# Patient Record
Sex: MALE
[De-identification: names, ages, dates, MRNs, and addresses within clinical notes are randomized per-mention and may not be internally consistent; named-entity substitution may affect disease eponyms.]

---

## 2018-02-20 ENCOUNTER — CHARTING TRANS (OUTPATIENT)
Dept: OTHER | Age: 33
End: 2018-02-20

## 2018-02-20 ASSESSMENT — PAIN SCALES - GENERAL: PAINLEVEL_OUTOF10: 0

## 2018-11-01 VITALS
OXYGEN SATURATION: 98 % | SYSTOLIC BLOOD PRESSURE: 146 MMHG | TEMPERATURE: 98.7 F | HEART RATE: 88 BPM | DIASTOLIC BLOOD PRESSURE: 88 MMHG | RESPIRATION RATE: 16 BRPM

## 2025-04-03 ENCOUNTER — TELEPHONE (OUTPATIENT)
Dept: NEUROLOGY | Facility: CLINIC | Age: 40
End: 2025-04-03

## 2025-04-03 NOTE — TELEPHONE ENCOUNTER
[facility]. Endorsed to nurses bin for upcoming appointment.     Future Appointments   Date Time Provider Department Center   5/27/2025  8:20 AM Bennett Hernandez DO [id number]

## 2025-04-17 ENCOUNTER — TELEPHONE (OUTPATIENT)
Dept: NEUROLOGY | Facility: CLINIC | Age: 40
End: 2025-04-17

## 2025-04-17 ENCOUNTER — OFFICE VISIT (OUTPATIENT)
Dept: NEUROLOGY | Facility: CLINIC | Age: 40
End: 2025-04-17
Payer: OTHER GOVERNMENT

## 2025-04-17 VITALS
RESPIRATION RATE: 16 BRPM | WEIGHT: 180 LBS | HEART RATE: 68 BPM | SYSTOLIC BLOOD PRESSURE: 122 MMHG | DIASTOLIC BLOOD PRESSURE: 62 MMHG

## 2025-04-17 DIAGNOSIS — G43.709 CHRONIC MIGRAINE WITHOUT AURA WITHOUT STATUS MIGRAINOSUS, NOT INTRACTABLE: Primary | ICD-10-CM

## 2025-04-17 PROCEDURE — 99204 OFFICE O/P NEW MOD 45 MIN: CPT | Performed by: OTHER

## 2025-04-17 RX ORDER — FOLIC ACID 1 MG/1
TABLET ORAL
COMMUNITY

## 2025-04-17 RX ORDER — NORTRIPTYLINE HYDROCHLORIDE 10 MG/1
10 CAPSULE ORAL
COMMUNITY
Start: 2024-07-29

## 2025-04-17 RX ORDER — PRAZOSIN HYDROCHLORIDE 1 MG/1
1 CAPSULE ORAL
COMMUNITY
Start: 2024-07-29

## 2025-04-17 RX ORDER — SUMATRIPTAN 50 MG/1
50 TABLET, FILM COATED ORAL
COMMUNITY
Start: 2024-07-29

## 2025-04-17 RX ORDER — ATOGEPANT 60 MG/1
60 TABLET ORAL DAILY
Qty: 16 TABLET | Refills: 0 | COMMUNITY
Start: 2025-04-17

## 2025-04-17 RX ORDER — GABAPENTIN 100 MG/1
100 CAPSULE ORAL
COMMUNITY
Start: 2024-07-29

## 2025-04-17 RX ORDER — ATOGEPANT 60 MG/1
60 TABLET ORAL DAILY
Qty: 30 TABLET | Refills: 3 | Status: SHIPPED | OUTPATIENT
Start: 2025-04-17

## 2025-04-17 RX ORDER — ATOGEPANT 60 MG/1
60 TABLET ORAL DAILY
Qty: 30 TABLET | Refills: 3 | Status: SHIPPED | OUTPATIENT
Start: 2025-04-17 | End: 2025-04-17

## 2025-04-17 RX ORDER — ESCITALOPRAM OXALATE 20 MG/1
20 TABLET ORAL
COMMUNITY
Start: 2024-07-29

## 2025-04-17 RX ORDER — PROPRANOLOL HYDROCHLORIDE 60 MG/1
60 CAPSULE, EXTENDED RELEASE ORAL
COMMUNITY
Start: 2024-07-29

## 2025-04-17 NOTE — TELEPHONE ENCOUNTER
Pt in office today and given samples of Qulipta.    Pt prescribed [facility] by provider [facility]    [facility]

## 2025-04-17 NOTE — PATIENT INSTRUCTIONS
After your visit at the [facility] today,  please direct any follow up questions or medication needs to the staff in our [place] office so that your concerns may be promptly addressed.  We are available through [employer] or at the numbers below:    The phone number is:   [phone] option #1    The fax number is:  [phone]    Your pharmacy should also send any requests electronically to the [place] office.  Refill policies:    Allow 2-3 business days for refills; controlled substances may take longer.  Contact your pharmacy at least 5 days prior to running out of medication and have them send an electronic request or submit request through the “request refill” option in your [employer] account.  Refills are not addressed on weekends; covering physicians do not authorize routine medications on weekends.  No narcotics or controlled substances are refilled after noon on Fridays or by on call physicians.  By law, narcotics must be electronically prescribed.  A 30 day supply with no refills is the maximum allowed.  If your prescription is due for a refill, you may be due for a follow up appointment.  To best provide you care, patients receiving routine medications need to be seen at least once a year.  Patients receiving narcotic/controlled substance medications need to be seen at least once every 3 months.  In the event that your preferred pharmacy does not have the requested medication in stock (e.g. Backordered), it is your responsibility to find another pharmacy that has the requested medication available.  We will gladly send a new prescription to that pharmacy at your request.    Scheduling Tests:    If your physician has ordered radiology tests such as MRI or CT scans, please contact Central Scheduling at [phone] right away to schedule the test.  Once scheduled, [facility] will work with your insurance carrier to obtain pre-certification or prior authorization.   Depending on your insurance carrier, approval may take 3-10 days.  It is highly recommended patients assure they have received an authorization before having a test performed.  If test is done without insurance authorization, patient may be responsible for the entire amount billed.      Precertification and Prior Authorizations:  If your physician has recommended that you have a procedure or additional testing performed the [facility] Centralized Referral Team will contact your insurance carrier to obtain pre-certification or prior authorization.    You are strongly encouraged to contact your insurance carrier to verify that your procedure/test has been approved and is a COVERED benefit.  Although the [facility] Centralized Referral Team does its due diligence, the insurance carrier gives the disclaimer that \"Although the procedure is authorized, this does not guarantee payment.\"    Ultimately the patient is responsible for payment.   Thank you for your understanding in this matter.  Paperwork Completion:  If you require FMLA or disability paperwork for your recovery, please make sure to either drop it off or have it faxed to our office at [phone]. Be sure the form has your name and date of birth on it.  The form will be faxed to our Forms Department and they will complete it for you.  There is a 25$ fee for all forms that need to be filled out.  Please be aware there is a 10-14 day turnaround time.  You will need to sign a release of information (SAHARA) form if your paperwork does not come with one.  You may call the Forms Department with any questions at [phone].  Their fax number is [phone].

## 2025-04-17 NOTE — H&P
Neurology H&P    Marc Poole Patient Status:  No patient class for patient encounter    1985 MRN [id number]   Location [facility], [address] [place] [address] Attending No att. providers found   Hosp Day # 0 PCP Louis Hatfield MD     Subjective:  Marc Poole is a(n) 39 year old male with a PMH significant for PTSD who comes to the neurology clinic for evalaution of migraines. He states that [facility]. He states that he started to get migraines in the . He states that he has had brain imaging in [place] for his headaches but I have no notes to review regarding this. His migraines started a couple years ago and have been worsening. He gets migraines that last up to 3 days a few times per week. He states that he gets on average about 16 migraines per month. He endorses sensitivity to light and sound. He gets a little nauseated but does not have vomiting. He is currently taking propanol, gabapentin and nortriptyline and sumatriptan. He has no FH of migraine. He does not know of any weather or food triggers for his migraines.      Current Medications:  Current Medications[1]    Problem List:  Problem List[2]    PMHx:  Past Medical History[3]    PSHx:  Past Surgical History[4]    SocHx:  Short Social Hx on File[5]    Family History:  Family History[6]    No FH of migraine    ROS:  10 point ROS completed and was negative, except for pertinent positive and negatives stated in subjective.    Objective/Physical Exam:    Vital Signs:  Blood pressure 122/62, pulse 68, resp. rate 16, weight 180 lb (81.6 kg).    Gen: Awake and in no apparent distress  HEENT: moist mucus membranes  Neck: Supple  Cardiovascular: Regular rate and rhythm, no murmur  Pulm: CTAB  GI: non-tender, normal bowel sounds  Skin: normal, dry  Extremities: No clubbing or cyanosis      Neurologic:   MENTAL STATUS: alert, ox3, normal attention, language and fund of knowledge.      CRANIAL  NERVES II to XII: PERRLA, no ptosis or diplopia, EOM intact, facial sensation intact, strong eye closure, face is symmetric, no dysarthria, tongue midline,  no tongue fasciculations or atrophy, strong shoulder shrug.    MOTOR EXAMINATION: normal tone, no fasciculations, normal strength throughout in UEs and LEs      SENSORY EXAMINATION:  UE: intact to light touch, pinprick intact  LE: intact to light touch, pinprick intact    COORDINATION:  No dysmetria, or intention tremors     REFLEXES: 2+ at biceps, 2+ brachioradialis, 2+ at patella     GAIT: normal stance, normal toe gait and heel gait, tandem well.    Romberg's: negative        Labs:       Imaging:      Assessment:  This is a 40 y/o male with migraine headaches.  He is getting 15 or more migraines per month at this time.  He is already on nortriptyline, propranolol, and gabapentin as well as sumatriptan for breakthrough.  He states that 3 preventative medications did not really reduce his migraine frequency.  We discussed Botox today.  He would like to hold off on any sort of Botox for now.  He would like to avoid any sort of injections at this time.  We can try Qulipta 60 mg daily for migraine prevention.      Plan:  Migraine w/o aura  - Already on gabapentin, nortriptyline and propanol  - Declines botox at this time  - Start Qulipta 60mg Qday  - We also discussed will Hernandez, DO  Neurology         [1]   Current Outpatient Medications   Medication Sig Dispense Refill    diclofenac 1 % External Gel Apply topically.      escitalopram 20 MG Oral Tab Take 1 tablet (20 mg total) by mouth.      gabapentin 100 MG Oral Cap Take 1 capsule (100 mg total) by mouth 3 (three) times daily before meals.      prazosin 1 MG Oral Cap Take 1 capsule (1 mg total) by mouth.      nortriptyline 10 MG Oral Cap Take 1 capsule (10 mg total) by mouth.      Propranolol HCl ER 60 MG Oral Capsule SR 24 Hr Take 1 capsule (60 mg total) by mouth.      SUMAtriptan 50 MG Oral  Tab Take 1 tablet (50 mg total) by mouth.      Cholecalciferol (VITAMIN D3) 1.25 MG (60025 UT) Oral Cap Take by mouth.     [2] There is no problem list on file for this patient.   [3]   Past Medical History:   Arthritis    Migraines    PTSD (post-traumatic stress disorder)    Sleep apnea   [4] History reviewed. No pertinent surgical history.  [5]   Social History  Socioeconomic History    Marital status:    Tobacco Use    Smoking status: Every Day     Types: Cigarettes    Smokeless tobacco: Former   Substance and Sexual Activity    Alcohol use: Yes     Comment: some    Drug use: Never   Other Topics Concern    Caffeine Concern No    Exercise No   [6] History reviewed. No pertinent family history.

## 2025-04-17 NOTE — PROGRESS NOTES
Pt reports headaches started approximately 1 year ago.  Headaches every other day.  Can last 3 days straight.

## 2025-07-17 ENCOUNTER — OFFICE VISIT (OUTPATIENT)
Dept: NEUROLOGY | Facility: CLINIC | Age: 40
End: 2025-07-17
Payer: OTHER GOVERNMENT

## 2025-07-17 VITALS — HEART RATE: 73 BPM | DIASTOLIC BLOOD PRESSURE: 68 MMHG | RESPIRATION RATE: 16 BRPM | SYSTOLIC BLOOD PRESSURE: 126 MMHG

## 2025-07-17 DIAGNOSIS — G43.709 CHRONIC MIGRAINE WITHOUT AURA WITHOUT STATUS MIGRAINOSUS, NOT INTRACTABLE: Primary | ICD-10-CM

## 2025-07-17 PROCEDURE — 99214 OFFICE O/P EST MOD 30 MIN: CPT | Performed by: OTHER

## 2025-07-17 RX ORDER — SUMATRIPTAN 50 MG/1
TABLET, FILM COATED ORAL
Qty: 9 TABLET | Refills: 5 | Status: SHIPPED | OUTPATIENT
Start: 2025-07-17

## 2025-07-17 RX ORDER — BUPROPION HYDROCHLORIDE 150 MG/1
150 TABLET ORAL
COMMUNITY
Start: 2025-04-30 | End: 2025-07-17 | Stop reason: ALTCHOICE

## 2025-07-17 RX ORDER — ATOGEPANT 60 MG/1
60 TABLET ORAL DAILY
Qty: 30 TABLET | Refills: 5 | Status: SHIPPED | OUTPATIENT
Start: 2025-07-17

## 2025-07-17 RX ORDER — NORTRIPTYLINE HYDROCHLORIDE 25 MG/1
25 CAPSULE ORAL NIGHTLY
Qty: 90 CAPSULE | Refills: 1 | Status: SHIPPED | OUTPATIENT
Start: 2025-07-17

## 2025-07-17 NOTE — PROGRESS NOTES
Pt reports he continues to have migraines 2 times per week.  [facility].           The following individual(s) verbally consented to be recorded using ambient AI listening technology and understand that they can each withdraw their consent to this listening technology at any point by asking the clinician to turn off or pause the recording:    Patient name: Marc Poole

## 2025-07-17 NOTE — PROGRESS NOTES
Neurology H&P    Marc Poole Patient Status:  No patient class for patient encounter    1985 MRN [id number]   Location [facility], [address] [place] [address] Attending No att. providers found   Hosp Day # 0 PCP Louis Hatfield MD     Subjective:  Marc Poole is a(n) 40 year old male.    INtiital HPI 25  Marc Poole is a(n) 39 year old male with a PMH significant for PTSD who comes to the neurology clinic for evalaution of migraines. He [facility] that [facility]. He states that he started to get migraines in the . He states that he has had brain imaging in [place] for his headaches but I have no notes to review regarding this. His migraines started a couple years ago and have been worsening. He gets migraines that last up to 3 days a few times per week. He states that he gets on average about 16 migraines per month. He endorses sensitivity to light and sound. He gets a little nauseated but does not have vomiting. He is currently taking propanol, gabapentin and nortriptyline and sumatriptan. He has no FH of migraine. He does not know of any weather or food triggers for his migraines.   History of Present Illness  Marc Poole is a 40 year old male with chronic migraines who presents for follow-up on migraine management.    He experiences approximately 20 migraines per month. The frequency remains high, but the duration of each episode has decreased, with fewer migraines occurring overnight. The effectiveness of Qulipta is variable, providing relief at times.    He is currently on multiple medications for migraine management, including Qulipta and nortriptyline. He also uses sumatriptan, taking one tablet at the onset of a headache and another two to four hours later, as needed. He was previously unaware of the dosing instructions for sumatriptan, as they were not provided on the medication bottle.    He feels overwhelmed by the number of  medications he is taking and mentions being 'tired of all the medication.'     Current Medications:  Current Medications[1]    Problem List:  Problem List[2]    PMHx:  Past Medical History[3]    PSHx:  Past Surgical History[4]    SocHx:  Short Social Hx on File[5]    Family History:  Family History[6]    No FH of migraine    ROS:  10 point ROS completed and was negative, except for pertinent positive and negatives stated in subjective.    Objective/Physical Exam:    Vital Signs:  Blood pressure 126/68, pulse 73, resp. rate 16.    Gen: Awake and in no apparent distress  HEENT: moist mucus membranes  Neck: Supple  Cardiovascular: Regular rate and rhythm, no murmur  Pulm: CTAB  GI: non-tender, normal bowel sounds  Skin: normal, dry  Extremities: No clubbing or cyanosis      Neurologic:   Physical Exam    MENTAL STATUS: alert, ox3, normal attention, language and fund of knowledge.       CRANIAL NERVES II to XII: PERRLA, no ptosis or diplopia, EOM intact, facial sensation intact, strong eye closure, face is symmetric, no dysarthria, tongue midline,  no tongue fasciculations or atrophy, strong shoulder shrug.     MOTOR EXAMINATION: normal tone, no fasciculations, normal strength throughout in UEs and LEs       SENSORY EXAMINATION:  UE: intact to light touch, pinprick intact  LE: intact to light touch, pinprick intact     COORDINATION:  No dysmetria, or intention tremors      REFLEXES: 2+ at biceps, 2+ brachioradialis, 2+ at patella      GAIT: normal stance, normal toe gait and heel gait, tandem well.     Romberg's: negative         Labs:       Imaging:  No CNS imaging to review       Assessment & Plan  Migraine without aura  Migraines, approximately 20 times monthly, with partial response to current regimen. Agreed to increase nortriptyline to 25mg nightly. .  - Increase nortriptyline to 25 mg nightly.  - Continue Qulipta as prescribed.  - Refill Qulipta and sumatriptan.  - Instructed on sumatriptan: one tablet at  headache onset, repeat in 2-4 hours, max 2-3 times weekly.  - Consider Botox if migraines persist, noting potential [facility] restrictions with Qulipta.        Bennett Hernandez, DO  Neurology         [1]   Current Outpatient Medications   Medication Sig Dispense Refill    SUMAtriptan 50 MG Oral Tab Take one tablet at onset of migraine.  If migraine continues, may take one tablet 2 hours later.  NO more than 2 tablets in 24 hours and only use max 2 days per week 9 tablet 5    Atogepant (QULIPTA) 60 MG Oral Tab Take 60 mg by mouth daily. 30 tablet 5    nortriptyline 25 MG Oral Cap Take 1 capsule (25 mg total) by mouth nightly. 90 capsule 1    diclofenac 1 % External Gel Apply topically.      escitalopram 20 MG Oral Tab Take 1 tablet (20 mg total) by mouth.      gabapentin 100 MG Oral Cap Take 1 capsule (100 mg total) by mouth 3 (three) times daily before meals.      prazosin 1 MG Oral Cap Take 1 capsule (1 mg total) by mouth.      Propranolol HCl ER 60 MG Oral Capsule SR 24 Hr Take 1 capsule (60 mg total) by mouth.      Cholecalciferol (VITAMIN D3) 1.25 MG (54380 UT) Oral Cap Take by mouth.      Atogepant (QULIPTA) 60 MG Oral Tab Take 60 mg by mouth daily. 16 tablet 0   [2]   Patient Active Problem List  Diagnosis    Chronic migraine without aura without status migrainosus, not intractable   [3]   Past Medical History:   Anxiety    Arthritis    Depression    Essential hypertension    Migraines    PTSD (post-traumatic stress disorder)    Sleep apnea   [4] History reviewed. No pertinent surgical history.  [5]   Social History  Socioeconomic History    Marital status:    Tobacco Use    Smoking status: Every Day     Current packs/day: 1.00     Average packs/day: 1 pack/day for 5.0 years (5.0 ttl pk-yrs)     Types: Cigarettes    Smokeless tobacco: Former   Substance and Sexual Activity    Alcohol use: Yes     Alcohol/week: 6.0 standard drinks of alcohol     Types: 6 Cans of beer per week     Comment: some    Drug use:  Never   Other Topics Concern    Caffeine Concern No    Exercise Yes    Seat Belt No    Special Diet No    Stress Concern Yes    Weight Concern No   [6] History reviewed. No pertinent family history.

## 2025-07-17 NOTE — PATIENT INSTRUCTIONS
After your visit at the [facility] today,  please direct any follow up questions or medication needs to the staff in our [place] office so that your concerns may be promptly addressed.  We are available through [employer] or at the numbers below:    The phone number is:   [phone] option #1    The fax number is:  [phone]    Your pharmacy should also send any requests electronically to the [place] office.  Refill policies:    Allow 2-3 business days for refills; controlled substances may take longer.  Contact your pharmacy at least 5 days prior to running out of medication and have them send an electronic request or submit request through the “request refill” option in your [employer] account.  Refills are not addressed on weekends; covering physicians do not authorize routine medications on weekends.  No narcotics or controlled substances are refilled after noon on Fridays or by on call physicians.  By law, narcotics must be electronically prescribed.  A 30 day supply with no refills is the maximum allowed.  If your prescription is due for a refill, you may be due for a follow up appointment.  To best provide you care, patients receiving routine medications need to be seen at least once a year.  Patients receiving narcotic/controlled substance medications need to be seen at least once every 3 months.  In the event that your preferred pharmacy does not have the requested medication in stock (e.g. Backordered), it is your responsibility to find another pharmacy that has the requested medication available.  We will gladly send a new prescription to that pharmacy at your request.    Scheduling Tests:    If your physician has ordered radiology tests such as MRI or CT scans, please contact Central Scheduling at [phone] right away to schedule the test.  Once scheduled, the [facility] Centralized Referral Team will work with your insurance carrier to obtain pre-certification or prior authorization.   Depending on your insurance carrier, approval may take 3-10 days.  It is highly recommended patients assure they have received an authorization before having a test performed.  If test is done without insurance authorization, patient may be responsible for the entire amount billed.      Precertification and Prior Authorizations:  If your physician has recommended that you have a procedure or additional testing performed the [facility] Centralized Referral Team will contact your insurance carrier to obtain pre-certification or prior authorization.    You are strongly encouraged to contact your insurance carrier to verify that your procedure/test has been approved and is a COVERED benefit.  Although the [facility] Centralized Referral Team does its due diligence, the insurance carrier gives the disclaimer that \"Although the procedure is authorized, this does not guarantee payment.\"    Ultimately the patient is responsible for payment.   Thank you for your understanding in this matter.  Paperwork Completion:  If you require FMLA or disability paperwork for your recovery, please make sure to either drop it off or have it faxed to our office at [phone]. Be sure the form has your name and date of birth on it.  The form will be faxed to our Forms Department and they will complete it for you.  There is a 25$ fee for all forms that need to be filled out.  Please be aware there is a 10-14 day turnaround time.  You will need to sign a release of information (SAHARA) form if your paperwork does not come with one.  You may call the Forms Department with any questions at [phone].  Their fax number is [phone].

## 2025-08-20 ENCOUNTER — TELEPHONE (OUTPATIENT)
Dept: NEUROLOGY | Facility: CLINIC | Age: 40
End: 2025-08-20